# Patient Record
Sex: FEMALE | Race: WHITE | NOT HISPANIC OR LATINO
[De-identification: names, ages, dates, MRNs, and addresses within clinical notes are randomized per-mention and may not be internally consistent; named-entity substitution may affect disease eponyms.]

---

## 2017-02-15 ENCOUNTER — APPOINTMENT (OUTPATIENT)
Dept: OPHTHALMOLOGY | Facility: CLINIC | Age: 67
End: 2017-02-15

## 2018-07-09 NOTE — PATIENT DISCUSSION
PATIENT TO CONSIDER RLE FOR MONOVISION. DR Doreen Fernandes MAY DEEM CATARACTS VISUALLY SIGNIFICANT AT WORK UP.   PATIENT TO DISCONTINUE CLS AND USE ATS QID AT LEAST 1 WEEK PRIOR TO WORK-UP

## 2018-10-30 ENCOUNTER — IMPORTED ENCOUNTER (OUTPATIENT)
Dept: URBAN - METROPOLITAN AREA CLINIC 50 | Facility: CLINIC | Age: 68
End: 2018-10-30

## 2020-01-29 ENCOUNTER — IMPORTED ENCOUNTER (OUTPATIENT)
Dept: URBAN - METROPOLITAN AREA CLINIC 50 | Facility: CLINIC | Age: 70
End: 2020-01-29

## 2020-04-28 ENCOUNTER — IMPORTED ENCOUNTER (OUTPATIENT)
Dept: URBAN - METROPOLITAN AREA CLINIC 50 | Facility: CLINIC | Age: 70
End: 2020-04-28

## 2020-08-14 ENCOUNTER — IMPORTED ENCOUNTER (OUTPATIENT)
Dept: URBAN - METROPOLITAN AREA CLINIC 50 | Facility: CLINIC | Age: 70
End: 2020-08-14

## 2020-08-17 ENCOUNTER — IMPORTED ENCOUNTER (OUTPATIENT)
Dept: URBAN - METROPOLITAN AREA CLINIC 50 | Facility: CLINIC | Age: 70
End: 2020-08-17

## 2020-08-17 NOTE — PATIENT DISCUSSION
"""Insertion of hydro gel punctal plugs All lids done today with patient written consent."" ""Start Theratears both eyes three times a day . """

## 2020-08-24 ENCOUNTER — IMPORTED ENCOUNTER (OUTPATIENT)
Dept: URBAN - METROPOLITAN AREA CLINIC 50 | Facility: CLINIC | Age: 70
End: 2020-08-24

## 2020-10-15 ENCOUNTER — IMPORTED ENCOUNTER (OUTPATIENT)
Dept: URBAN - METROPOLITAN AREA CLINIC 50 | Facility: CLINIC | Age: 70
End: 2020-10-15

## 2021-04-17 ASSESSMENT — VISUAL ACUITY
OD_CC: J1+@ 16 IN
OD_BAT: 20/80
OS_PH: 20/40
OS_CC: J1
OS_CC: J1+@ 16 IN
OS_OTHER: 20/80. >20/400.
OD_CC: 20/30-
OD_CC: 20/20
OD_OTHER: 20/30. 20/40.
OD_PH: 20/40
OD_SC: 20/50
OS_CC: 20/30-
OD_CC: J1+
OS_SC: 20/70
OD_CC: 20/20
OS_BAT: 20/25
OD_OTHER: 20/80. >20/400.
OD_CC: 20/20
OS_OTHER: 20/25. 20/25.
OS_CC: 20/20-
OD_BAT: 20/30
OS_CC: 20/25-
OS_CC: J16
OD_CC: J1
OD_CC: J16
OS_CC: 20/20-1
OS_CC: J1+
OS_BAT: 20/80

## 2021-04-17 ASSESSMENT — TONOMETRY
OS_IOP_MMHG: 14
OS_IOP_MMHG: 13
OD_IOP_MMHG: 12
OS_IOP_MMHG: 13
OS_IOP_MMHG: 13
OD_IOP_MMHG: 14
OD_IOP_MMHG: 13
OD_IOP_MMHG: 13

## 2021-08-11 ENCOUNTER — PREPPED CHART (OUTPATIENT)
Dept: URBAN - METROPOLITAN AREA CLINIC 49 | Facility: CLINIC | Age: 71
End: 2021-08-11

## 2021-08-12 ENCOUNTER — PROBLEM (OUTPATIENT)
Dept: URBAN - METROPOLITAN AREA CLINIC 49 | Facility: CLINIC | Age: 71
End: 2021-08-12

## 2021-08-12 DIAGNOSIS — H35.373: ICD-10-CM

## 2021-08-12 DIAGNOSIS — H04.123: ICD-10-CM

## 2021-08-12 PROCEDURE — 92134 CPTRZ OPH DX IMG PST SGM RTA: CPT

## 2021-08-12 PROCEDURE — 92014 COMPRE OPH EXAM EST PT 1/>: CPT

## 2021-08-12 PROCEDURE — 92015 DETERMINE REFRACTIVE STATE: CPT

## 2021-08-12 PROCEDURE — 68761 CLOSE TEAR DUCT OPENING: CPT

## 2021-08-12 ASSESSMENT — TONOMETRY
OS_IOP_MMHG: 14
OD_IOP_MMHG: 14

## 2021-08-12 ASSESSMENT — VISUAL ACUITY
OS_GLARE: 20/30
OD_CC: 20/25-2
OS_GLARE: 20/40
OU_CC: J1
OS_CC: 20/25-1
OD_GLARE: 20/50
OD_GLARE: 20/40

## 2021-08-12 NOTE — PROCEDURE NOTE: CLINICAL
PROCEDURE NOTE: Punctal Plugs Bilateral Lower Lids. Diagnosis: Dry Eye Syndrome. Informed consent was obtained. Size/location of plugs inserted: Form fit. Patient tolerated procedure without complication or difficulty. Macy Rush

## 2022-02-03 ENCOUNTER — FOLLOW UP (OUTPATIENT)
Dept: URBAN - METROPOLITAN AREA CLINIC 49 | Facility: CLINIC | Age: 72
End: 2022-02-03

## 2022-02-03 DIAGNOSIS — H02.834: ICD-10-CM

## 2022-02-03 DIAGNOSIS — H43.813: ICD-10-CM

## 2022-02-03 DIAGNOSIS — H25.11: ICD-10-CM

## 2022-02-03 DIAGNOSIS — H02.831: ICD-10-CM

## 2022-02-03 DIAGNOSIS — H35.373: ICD-10-CM

## 2022-02-03 DIAGNOSIS — H04.123: ICD-10-CM

## 2022-02-03 DIAGNOSIS — H25.042: ICD-10-CM

## 2022-02-03 DIAGNOSIS — H10.13: ICD-10-CM

## 2022-02-03 PROCEDURE — 92134 CPTRZ OPH DX IMG PST SGM RTA: CPT

## 2022-02-03 PROCEDURE — 92014 COMPRE OPH EXAM EST PT 1/>: CPT

## 2022-02-03 ASSESSMENT — VISUAL ACUITY
OD_GLARE: 20/50
OU_CC: J1@17"
OD_CC: 20/30-2
OD_GLARE: 20/40
OS_GLARE: 20/40
OS_GLARE: 20/30
OS_CC: 20/25

## 2022-02-03 ASSESSMENT — TONOMETRY
OS_IOP_MMHG: 16
OD_IOP_MMHG: 16

## 2022-02-15 ENCOUNTER — DIAGNOSTICS ONLY (OUTPATIENT)
Dept: URBAN - METROPOLITAN AREA CLINIC 53 | Facility: CLINIC | Age: 72
End: 2022-02-15

## 2022-02-15 DIAGNOSIS — H25.042: ICD-10-CM

## 2022-02-15 DIAGNOSIS — H25.11: ICD-10-CM

## 2022-02-15 PROCEDURE — 92025IOL CORNEAL TOPOGRAPHY PREMIUM IOL

## 2022-02-15 PROCEDURE — 92136 OPHTHALMIC BIOMETRY: CPT

## 2022-02-15 ASSESSMENT — KERATOMETRY
OS_AXISANGLE_DEGREES: 072
OS_K2POWER_DIOPTERS: 44.50
OD_AXISANGLE_DEGREES: 90
OD_K2POWER_DIOPTERS: 43.50
OS_AXISANGLE2_DEGREES: 162
OS_K1POWER_DIOPTERS: 44.87
OD_AXISANGLE2_DEGREES: 0
OD_K1POWER_DIOPTERS: 43.62

## 2022-03-03 ENCOUNTER — PRE-OP/H&P (OUTPATIENT)
Dept: URBAN - METROPOLITAN AREA CLINIC 53 | Facility: CLINIC | Age: 72
End: 2022-03-03

## 2022-03-03 DIAGNOSIS — H25.11: ICD-10-CM

## 2022-03-03 DIAGNOSIS — H25.12: ICD-10-CM

## 2022-03-03 DIAGNOSIS — H35.373: ICD-10-CM

## 2022-03-03 DIAGNOSIS — H43.813: ICD-10-CM

## 2022-03-03 DIAGNOSIS — H25.042: ICD-10-CM

## 2022-03-03 PROCEDURE — 92014 COMPRE OPH EXAM EST PT 1/>: CPT

## 2022-03-03 ASSESSMENT — VISUAL ACUITY
OD_CC: 20/30
OS_CC: 20/25

## 2022-03-03 ASSESSMENT — TONOMETRY
OD_IOP_MMHG: 14
OS_IOP_MMHG: 14

## 2022-03-03 ASSESSMENT — KERATOMETRY
OD_AXISANGLE_DEGREES: 90
OS_AXISANGLE_DEGREES: 072
OD_K2POWER_DIOPTERS: 43.50
OS_K2POWER_DIOPTERS: 44.50
OS_AXISANGLE2_DEGREES: 162
OD_AXISANGLE2_DEGREES: 0
OS_K1POWER_DIOPTERS: 44.87
OD_K1POWER_DIOPTERS: 43.62

## 2022-03-03 NOTE — PATIENT DISCUSSION
CATARACT SURGERY PLANNER - STANDARD IOL/+FEMTO: Phacoemulsification with IOL: Eye: OD|DOS: 03/08/2022|Model: DIBOO|Power: +23.00|Target: PLANO|Femto: YE|Arcs: 40 @ 0 ; 40 @ 180|Visc: DUET|Omidria: YES|10% Phenylephrine: YES|Epi-shugarcaine: YES|Phaco Setting: DENSE|BSS+: NO|Trypan Blue: NO|CTR: NO|Olive Tip: NO|Atropine: NO|Pupilloplasty: NO|Notes: DENSE. Martin Rust

## 2022-03-08 ENCOUNTER — SURGERY/PROCEDURE (OUTPATIENT)
Dept: URBAN - METROPOLITAN AREA SURGERY 16 | Facility: SURGERY | Age: 72
End: 2022-03-08

## 2022-03-08 ENCOUNTER — POST-OP (OUTPATIENT)
Dept: URBAN - METROPOLITAN AREA CLINIC 53 | Facility: CLINIC | Age: 72
End: 2022-03-08

## 2022-03-08 DIAGNOSIS — H25.11: ICD-10-CM

## 2022-03-08 DIAGNOSIS — Z98.41: ICD-10-CM

## 2022-03-08 DIAGNOSIS — Z96.1: ICD-10-CM

## 2022-03-08 PROCEDURE — 99024 POSTOP FOLLOW-UP VISIT: CPT

## 2022-03-08 PROCEDURE — 66984 XCAPSL CTRC RMVL W/O ECP: CPT

## 2022-03-08 ASSESSMENT — TONOMETRY: OD_IOP_MMHG: 13

## 2022-03-08 ASSESSMENT — KERATOMETRY
OS_AXISANGLE_DEGREES: 072
OS_AXISANGLE2_DEGREES: 162
OS_K1POWER_DIOPTERS: 44.87
OD_K2POWER_DIOPTERS: 43.50
OS_K2POWER_DIOPTERS: 44.50
OD_AXISANGLE2_DEGREES: 0
OD_AXISANGLE_DEGREES: 90
OD_K1POWER_DIOPTERS: 43.62

## 2022-03-08 ASSESSMENT — VISUAL ACUITY: OD_SC: 20/30

## 2022-03-08 NOTE — PATIENT DISCUSSION
FOLLOW DROP SCHEDULE AND INCREASE PURPLE TOP ( PRED- MOXI- NEPAF) DROPS TO 3 TIMES A DAY. sTOP LID SCRUBS AT THIS TIME.

## 2022-03-18 ENCOUNTER — POST OP/EVAL OF SECOND EYE (OUTPATIENT)
Dept: URBAN - METROPOLITAN AREA CLINIC 53 | Facility: CLINIC | Age: 72
End: 2022-03-18

## 2022-03-18 DIAGNOSIS — Z98.41: ICD-10-CM

## 2022-03-18 DIAGNOSIS — H25.12: ICD-10-CM

## 2022-03-18 DIAGNOSIS — Z96.1: ICD-10-CM

## 2022-03-18 PROCEDURE — 92136 - 2N OPHTHALMIC BIOMETRY BY PARTIAL COHERENCE INTERFEROMETRY WITH INTRAOCULAR LENS POWER CALCULATION

## 2022-03-18 PROCEDURE — PREOP PRE OP VISIT

## 2022-03-18 ASSESSMENT — TONOMETRY
OS_IOP_MMHG: 14
OD_IOP_MMHG: 14

## 2022-03-18 ASSESSMENT — VISUAL ACUITY
OS_CC: 20/25
OD_SC: J1+
OD_SC: J3
OD_SC: 20/20

## 2022-03-18 NOTE — PATIENT DISCUSSION
CATARACT SURGERY PLANNER - STANDARD IOL/+FEMTO: Phacoemulsification with IOL: Eye: OS|DOS: 03/29/2022|Model: DIBOO|Power: +22.50|Target: PLANO|Femto: YES|Arcs: 30 @ 70 ; 30 @ 250|Visc: DUET|Omidria: YES|10% Phenylephrine: YES|Epi-shugarcaine: YES|Phaco Setting: DENSE|BSS+: NO|Trypan Blue: NO|CTR: NO|Olive Tip: NO|Atropine: NO|Pupilloplasty: NO|Notes: N/A.

## 2022-03-18 NOTE — PATIENT DISCUSSION
s/p PO # 1 Cataract extraction with od IOL, doing well. Continue with post op drops as directed, see instruction sheet provided to the patient. The patient would like to schedule cataract extraction with os IOL.

## 2022-03-29 ENCOUNTER — SURGERY/PROCEDURE (OUTPATIENT)
Dept: URBAN - METROPOLITAN AREA SURGERY 16 | Facility: SURGERY | Age: 72
End: 2022-03-29

## 2022-03-29 ENCOUNTER — POST-OP (OUTPATIENT)
Dept: URBAN - METROPOLITAN AREA CLINIC 48 | Facility: CLINIC | Age: 72
End: 2022-03-29

## 2022-03-29 DIAGNOSIS — H43.813: ICD-10-CM

## 2022-03-29 DIAGNOSIS — H02.831: ICD-10-CM

## 2022-03-29 DIAGNOSIS — Z98.42: ICD-10-CM

## 2022-03-29 DIAGNOSIS — H04.123: ICD-10-CM

## 2022-03-29 DIAGNOSIS — H25.12: ICD-10-CM

## 2022-03-29 DIAGNOSIS — Z98.41: ICD-10-CM

## 2022-03-29 DIAGNOSIS — H25.11: ICD-10-CM

## 2022-03-29 DIAGNOSIS — Z96.1: ICD-10-CM

## 2022-03-29 DIAGNOSIS — H02.834: ICD-10-CM

## 2022-03-29 DIAGNOSIS — H10.13: ICD-10-CM

## 2022-03-29 PROCEDURE — 66984 XCAPSL CTRC RMVL W/O ECP: CPT

## 2022-03-29 PROCEDURE — 99024 POSTOP FOLLOW-UP VISIT: CPT

## 2022-03-29 ASSESSMENT — TONOMETRY: OS_IOP_MMHG: 16

## 2022-03-29 ASSESSMENT — VISUAL ACUITY: OS_SC: 20/200

## 2022-04-06 ENCOUNTER — POST-OP (OUTPATIENT)
Dept: URBAN - METROPOLITAN AREA CLINIC 48 | Facility: CLINIC | Age: 72
End: 2022-04-06

## 2022-04-06 DIAGNOSIS — Z98.42: ICD-10-CM

## 2022-04-06 DIAGNOSIS — Z96.1: ICD-10-CM

## 2022-04-06 ASSESSMENT — VISUAL ACUITY
OD_SC: 20/25-2
OS_SC: 20/30+1
OU_CC: 20/25-1
OU_SC: J2
OU_SC: 20/25-2

## 2022-04-06 ASSESSMENT — TONOMETRY
OD_IOP_MMHG: 14
OS_IOP_MMHG: 14

## 2022-05-05 ENCOUNTER — POST-OP (OUTPATIENT)
Dept: URBAN - METROPOLITAN AREA CLINIC 53 | Facility: CLINIC | Age: 72
End: 2022-05-05

## 2022-05-05 DIAGNOSIS — Z98.42: ICD-10-CM

## 2022-05-05 DIAGNOSIS — H26.493: ICD-10-CM

## 2022-05-05 DIAGNOSIS — H35.373: ICD-10-CM

## 2022-05-05 DIAGNOSIS — Z98.41: ICD-10-CM

## 2022-05-05 DIAGNOSIS — H43.813: ICD-10-CM

## 2022-05-05 PROCEDURE — 99024 POSTOP FOLLOW-UP VISIT: CPT

## 2022-05-05 PROCEDURE — 92015 DETERMINE REFRACTIVE STATE: CPT

## 2022-05-05 PROCEDURE — 92134 CPTRZ OPH DX IMG PST SGM RTA: CPT

## 2022-05-05 ASSESSMENT — VISUAL ACUITY
OD_GLARE: 20/25
OS_SC: J16
OD_SC: J10
OD_SC: 20/60
OS_SC: 20/50
OD_SC: 20/25
OS_SC: 20/25-
OS_GLARE: 20/25
OS_GLARE: 20/25
OD_GLARE: 20/25

## 2022-05-05 ASSESSMENT — TONOMETRY
OS_IOP_MMHG: 13
OD_IOP_MMHG: 14

## 2022-09-22 ENCOUNTER — COMPREHENSIVE EXAM (OUTPATIENT)
Dept: URBAN - METROPOLITAN AREA CLINIC 53 | Facility: CLINIC | Age: 72
End: 2022-09-22

## 2022-09-22 DIAGNOSIS — H35.373: ICD-10-CM

## 2022-09-22 DIAGNOSIS — H43.813: ICD-10-CM

## 2022-09-22 DIAGNOSIS — H16.223: ICD-10-CM

## 2022-09-22 DIAGNOSIS — H02.834: ICD-10-CM

## 2022-09-22 DIAGNOSIS — H26.493: ICD-10-CM

## 2022-09-22 DIAGNOSIS — H02.831: ICD-10-CM

## 2022-09-22 PROCEDURE — 92015 DETERMINE REFRACTIVE STATE: CPT

## 2022-09-22 PROCEDURE — 92014 COMPRE OPH EXAM EST PT 1/>: CPT

## 2022-09-22 RX ORDER — FLUOROMETHOLONE 1 MG/ML
1 SOLUTION/ DROPS OPHTHALMIC TWICE A DAY
Start: 2022-09-22

## 2022-09-22 RX ORDER — LIFITEGRAST 50 MG/ML
1 SOLUTION/ DROPS OPHTHALMIC TWICE A DAY
Start: 2022-09-22

## 2022-09-22 ASSESSMENT — VISUAL ACUITY
OD_SC: 20/20
OS_GLARE: 20/25
OU_SC: J2"14IN
OS_GLARE: 20/30
OU_CC: J1+"14IN
OD_GLARE: 20/30
OS_PH: 20/25
OD_GLARE: 20/30
OS_SC: 20/30-1

## 2022-09-22 ASSESSMENT — TONOMETRY
OS_IOP_MMHG: 15
OD_IOP_MMHG: 14

## 2022-09-22 NOTE — PATIENT DISCUSSION
DED (RX) Symptomatic dry eye has not improved with the use of conservative treatments such as artificial tears, warm compresses, and/or lid scrubs. Recommend patient start prescription drops, Xiidra/Restasis BID OU. Patient informed of initial stinging and discomfort, possible high cost, and need of long-term treatment for benefit. Advised patient to continue preservative-free artificial tears QID OU, warm compresses BID OU, and lid scrubs BID OU for best results. RTC for follow up appointment 6-8 weeks after starting Xiidra/Restasis.

## 2022-09-22 NOTE — PATIENT DISCUSSION
Julieta Hadley: Patient counseled as to delayed onset effect of Ann Callaway. Discussed onset of action may take 2-3 months to achieve maximum effect. Reviewed with patient mild degree of irritation and burning with onset of usage. Patient verbalizes understanding and wishes to start medication. Will e-prescribe medication to patient's pharmacy and follow up to determine effect.

## 2022-11-15 ENCOUNTER — FOLLOW UP (OUTPATIENT)
Dept: URBAN - METROPOLITAN AREA CLINIC 53 | Facility: CLINIC | Age: 72
End: 2022-11-15

## 2022-11-15 DIAGNOSIS — H04.123: ICD-10-CM

## 2022-11-15 DIAGNOSIS — H26.493: ICD-10-CM

## 2022-11-15 PROCEDURE — 92012 INTRM OPH EXAM EST PATIENT: CPT

## 2022-11-15 PROCEDURE — 6876150 PUNCTUM PLUG, BILATERAL

## 2022-11-15 RX ORDER — POLYETHYLENE GLYCOL 400 2.5 MG/ML: 1 SOLUTION/ DROPS OPHTHALMIC

## 2022-11-15 ASSESSMENT — VISUAL ACUITY
OS_GLARE: 20/40
OS_SC: 20/25
OD_GLARE: 20/40
OS_GLARE: 20/60
OD_SC: 20/20
OD_GLARE: 20/25

## 2022-11-15 ASSESSMENT — TONOMETRY
OD_IOP_MMHG: 11
OS_IOP_MMHG: 12

## 2022-11-15 NOTE — PATIENT DISCUSSION
Discussed with patient to switch from flaxseed to fish oil. Start Blink artificial tears OU TID. Gel plugs placed with patient written consent.

## 2022-11-15 NOTE — PROCEDURE NOTE: CLINICAL
PROCEDURE NOTE: Punctal Plugs, Bilateral OU. Diagnosis: Dry Eye Syndrome. Informed consent was obtained. Size/location of plugs inserted: Gel Plugs 0.3mm x 2.5mm. Patient tolerated procedure without complication or difficulty. Thang Carpenter

## 2022-12-13 ENCOUNTER — APPOINTMENT (RX ONLY)
Dept: URBAN - METROPOLITAN AREA CLINIC 58 | Facility: CLINIC | Age: 72
Setting detail: DERMATOLOGY
End: 2022-12-13

## 2022-12-13 DIAGNOSIS — B35.4 TINEA CORPORIS: ICD-10-CM | Status: INADEQUATELY CONTROLLED

## 2022-12-13 PROCEDURE — ? COUNSELING

## 2022-12-13 PROCEDURE — 99203 OFFICE O/P NEW LOW 30 MIN: CPT

## 2022-12-13 PROCEDURE — ? PRESCRIPTION MEDICATION MANAGEMENT

## 2022-12-13 PROCEDURE — ? PRESCRIPTION

## 2022-12-13 RX ORDER — KETOCONAZOLE 20 MG/G
CREAM TOPICAL BID
Qty: 60 | Refills: 1 | Status: ERX | COMMUNITY
Start: 2022-12-13

## 2022-12-13 RX ADMIN — KETOCONAZOLE: 20 CREAM TOPICAL at 00:00

## 2022-12-13 ASSESSMENT — LOCATION ZONE DERM
LOCATION ZONE: FACE
LOCATION ZONE: ARM
LOCATION ZONE: SCALP

## 2022-12-13 ASSESSMENT — LOCATION DETAILED DESCRIPTION DERM
LOCATION DETAILED: LEFT PROXIMAL POSTERIOR UPPER ARM
LOCATION DETAILED: RIGHT SUPERIOR OCCIPITAL SCALP
LOCATION DETAILED: LEFT CHIN
LOCATION DETAILED: LEFT DISTAL DORSAL FOREARM

## 2022-12-13 ASSESSMENT — LOCATION SIMPLE DESCRIPTION DERM
LOCATION SIMPLE: LEFT POSTERIOR UPPER ARM
LOCATION SIMPLE: LEFT FOREARM
LOCATION SIMPLE: POSTERIOR SCALP
LOCATION SIMPLE: CHIN

## 2022-12-13 NOTE — HPI: SKIN LESION
What Type Of Note Output Would You Prefer (Optional)?: Standard Output
How Severe Is Your Skin Lesion?: moderate
Has Your Skin Lesion Been Treated?: not been treated
Is This A New Presentation, Or A Follow-Up?: Skin Lesions
Additional History: Patient stated she believes she has ring worm from kittens she got two weeks ago.

## 2023-04-04 ENCOUNTER — ESTABLISHED PATIENT (OUTPATIENT)
Dept: URBAN - METROPOLITAN AREA CLINIC 53 | Facility: CLINIC | Age: 73
End: 2023-04-04

## 2023-04-04 DIAGNOSIS — H02.883: ICD-10-CM

## 2023-04-04 DIAGNOSIS — H04.123: ICD-10-CM

## 2023-04-04 DIAGNOSIS — H02.886: ICD-10-CM

## 2023-04-04 PROCEDURE — 92012 INTRM OPH EXAM EST PATIENT: CPT

## 2023-04-04 ASSESSMENT — VISUAL ACUITY
OD_SC: 20/30-1
OS_PH: 20/40-1
OS_SC: 20/60

## 2023-04-04 ASSESSMENT — TONOMETRY
OD_IOP_MMHG: 16
OS_IOP_MMHG: 17

## 2023-08-17 ENCOUNTER — ESTABLISHED PATIENT (OUTPATIENT)
Dept: URBAN - METROPOLITAN AREA CLINIC 49 | Facility: LOCATION | Age: 73
End: 2023-08-17

## 2023-08-17 DIAGNOSIS — H10.13: ICD-10-CM

## 2023-08-17 DIAGNOSIS — T15.02XA: ICD-10-CM

## 2023-08-17 PROCEDURE — 99213 OFFICE O/P EST LOW 20 MIN: CPT

## 2023-08-17 PROCEDURE — 65222 REMOVE FOREIGN BODY FROM EYE: CPT

## 2023-08-17 RX ORDER — TOBRAMYCIN AND DEXAMETHASONE 1; 3 MG/ML; MG/ML: 1 SUSPENSION/ DROPS OPHTHALMIC

## 2023-08-17 ASSESSMENT — TONOMETRY
OD_IOP_MMHG: 16
OS_IOP_MMHG: 16

## 2023-08-17 ASSESSMENT — VISUAL ACUITY
OD_SC: 20/25-2
OS_SC: 20/40

## 2023-10-03 ENCOUNTER — COMPREHENSIVE EXAM (OUTPATIENT)
Dept: URBAN - METROPOLITAN AREA CLINIC 53 | Facility: CLINIC | Age: 73
End: 2023-10-03

## 2023-10-03 DIAGNOSIS — H35.373: ICD-10-CM

## 2023-10-03 DIAGNOSIS — H04.123: ICD-10-CM

## 2023-10-03 DIAGNOSIS — H26.493: ICD-10-CM

## 2023-10-03 DIAGNOSIS — H43.813: ICD-10-CM

## 2023-10-03 PROCEDURE — 99214 OFFICE O/P EST MOD 30 MIN: CPT

## 2023-10-03 PROCEDURE — 92134 CPTRZ OPH DX IMG PST SGM RTA: CPT

## 2023-10-03 ASSESSMENT — VISUAL ACUITY
OD_SC: 20/25
OD_GLARE: 20/20
OS_SC: 20/30-2
OS_GLARE: 20/25
OD_GLARE: 20/20
OS_GLARE: 20/25
OU_CC: J1+@14"

## 2023-10-03 ASSESSMENT — TONOMETRY
OD_IOP_MMHG: 12
OS_IOP_MMHG: 12

## 2024-01-23 ENCOUNTER — APPOINTMENT (RX ONLY)
Dept: URBAN - METROPOLITAN AREA CLINIC 58 | Facility: CLINIC | Age: 74
Setting detail: DERMATOLOGY
End: 2024-01-23

## 2024-01-23 DIAGNOSIS — L82.1 OTHER SEBORRHEIC KERATOSIS: ICD-10-CM | Status: STABLE

## 2024-01-23 DIAGNOSIS — L81.4 OTHER MELANIN HYPERPIGMENTATION: ICD-10-CM | Status: STABLE

## 2024-01-23 DIAGNOSIS — D18.0 HEMANGIOMA: ICD-10-CM | Status: STABLE

## 2024-01-23 DIAGNOSIS — L82.0 INFLAMED SEBORRHEIC KERATOSIS: ICD-10-CM | Status: WORSENING

## 2024-01-23 DIAGNOSIS — D22 MELANOCYTIC NEVI: ICD-10-CM | Status: STABLE

## 2024-01-23 PROBLEM — D22.5 MELANOCYTIC NEVI OF TRUNK: Status: ACTIVE | Noted: 2024-01-23

## 2024-01-23 PROBLEM — D18.01 HEMANGIOMA OF SKIN AND SUBCUTANEOUS TISSUE: Status: ACTIVE | Noted: 2024-01-23

## 2024-01-23 PROCEDURE — ? FULL BODY SKIN EXAM

## 2024-01-23 PROCEDURE — ? LIQUID NITROGEN

## 2024-01-23 PROCEDURE — ? COUNSELING

## 2024-01-23 PROCEDURE — ? TREATMENT REGIMEN

## 2024-01-23 PROCEDURE — 17110 DESTRUCTION B9 LES UP TO 14: CPT

## 2024-01-23 PROCEDURE — 99213 OFFICE O/P EST LOW 20 MIN: CPT | Mod: 25

## 2024-01-23 ASSESSMENT — LOCATION SIMPLE DESCRIPTION DERM
LOCATION SIMPLE: RIGHT FOOT
LOCATION SIMPLE: UPPER LIP
LOCATION SIMPLE: LEFT FOOT
LOCATION SIMPLE: LEFT LIP
LOCATION SIMPLE: ABDOMEN

## 2024-01-23 ASSESSMENT — LOCATION DETAILED DESCRIPTION DERM
LOCATION DETAILED: LEFT LATERAL ACHILLES SKIN
LOCATION DETAILED: LEFT UPPER CUTANEOUS LIP
LOCATION DETAILED: PERIUMBILICAL SKIN
LOCATION DETAILED: EPIGASTRIC SKIN
LOCATION DETAILED: RIGHT LATERAL ACHILLES SKIN
LOCATION DETAILED: PHILTRUM

## 2024-01-23 ASSESSMENT — LOCATION ZONE DERM
LOCATION ZONE: LIP
LOCATION ZONE: TRUNK
LOCATION ZONE: FEET

## 2024-01-23 NOTE — PROCEDURE: LIQUID NITROGEN
Medical Necessity Information: It is in your best interest to select a reason for this procedure from the list below. All of these items fulfill various CMS LCD requirements except the new and changing color options.
Medical Necessity Clause: This procedure was medically necessary because the lesions that were treated were:
Show Topical Anesthesia Variable?: Yes
Spray Paint Technique: No
Detail Level: Detailed
Spray Paint Text: The liquid nitrogen was applied to the skin utilizing a spray paint frosting technique.
Post-Care Instructions: I reviewed with the patient in detail post-care instructions. Patient is to wear sunprotection, and avoid picking at any of the treated lesions. Pt may apply Vaseline to crusted or scabbing areas.
Consent: The patient's consent was obtained including but not limited to risks of crusting, scabbing, blistering, scarring, darker or lighter pigmentary change, recurrence, incomplete removal and infection.

## 2024-02-12 ENCOUNTER — ESTABLISHED PATIENT (OUTPATIENT)
Dept: URBAN - METROPOLITAN AREA CLINIC 53 | Facility: CLINIC | Age: 74
End: 2024-02-12

## 2024-02-12 DIAGNOSIS — H02.883: ICD-10-CM

## 2024-02-12 DIAGNOSIS — H02.886: ICD-10-CM

## 2024-02-12 PROCEDURE — 92012 INTRM OPH EXAM EST PATIENT: CPT

## 2024-02-12 RX ORDER — OLOPATADINE HYDROCHLORIDE 2 MG/ML: 1 SOLUTION OPHTHALMIC ONCE A DAY

## 2024-02-12 ASSESSMENT — VISUAL ACUITY
OS_SC: 20/30
OD_SC: 20/25

## 2024-02-12 ASSESSMENT — TONOMETRY
OD_IOP_MMHG: 12
OS_IOP_MMHG: 12

## 2024-10-15 ENCOUNTER — APPOINTMENT (RX ONLY)
Dept: URBAN - METROPOLITAN AREA CLINIC 58 | Facility: CLINIC | Age: 74
Setting detail: DERMATOLOGY
End: 2024-10-15

## 2024-10-15 DIAGNOSIS — L57.0 ACTINIC KERATOSIS: ICD-10-CM | Status: INADEQUATELY CONTROLLED

## 2024-10-15 DIAGNOSIS — L81.4 OTHER MELANIN HYPERPIGMENTATION: ICD-10-CM | Status: STABLE

## 2024-10-15 DIAGNOSIS — L82.0 INFLAMED SEBORRHEIC KERATOSIS: ICD-10-CM | Status: WORSENING

## 2024-10-15 PROCEDURE — 99212 OFFICE O/P EST SF 10 MIN: CPT | Mod: 25

## 2024-10-15 PROCEDURE — ? COUNSELING

## 2024-10-15 PROCEDURE — 17003 DESTRUCT PREMALG LES 2-14: CPT | Mod: 59

## 2024-10-15 PROCEDURE — ? LIQUID NITROGEN

## 2024-10-15 PROCEDURE — 17000 DESTRUCT PREMALG LESION: CPT | Mod: 59

## 2024-10-15 PROCEDURE — 17110 DESTRUCTION B9 LES UP TO 14: CPT

## 2024-10-15 ASSESSMENT — LOCATION ZONE DERM
LOCATION ZONE: FACE
LOCATION ZONE: NOSE
LOCATION ZONE: HAND
LOCATION ZONE: ARM
LOCATION ZONE: TRUNK

## 2024-10-15 ASSESSMENT — LOCATION DETAILED DESCRIPTION DERM
LOCATION DETAILED: NASAL DORSUM
LOCATION DETAILED: LEFT MEDIAL MALAR CHEEK
LOCATION DETAILED: RIGHT RADIAL DORSAL HAND
LOCATION DETAILED: RIGHT DISTAL DORSAL FOREARM
LOCATION DETAILED: NASAL ROOT
LOCATION DETAILED: LEFT LATERAL SUPERIOR CHEST

## 2024-10-15 ASSESSMENT — LOCATION SIMPLE DESCRIPTION DERM
LOCATION SIMPLE: LEFT CHEEK
LOCATION SIMPLE: RIGHT HAND
LOCATION SIMPLE: NOSE
LOCATION SIMPLE: CHEST
LOCATION SIMPLE: RIGHT FOREARM

## 2024-10-15 NOTE — PROCEDURE: LIQUID NITROGEN
Render Note In Bullet Format When Appropriate: No
Medical Necessity Clause: This procedure was medically necessary because the lesions that were treated were:
Spray Paint Text: The liquid nitrogen was applied to the skin utilizing a spray paint frosting technique.
Consent: The patient's consent was obtained including but not limited to risks of crusting, scabbing, blistering, scarring, darker or lighter pigmentary change, recurrence, incomplete removal and infection.
Medical Necessity Information: It is in your best interest to select a reason for this procedure from the list below. All of these items fulfill various CMS LCD requirements except the new and changing color options.
Detail Level: Detailed
Post-Care Instructions: I reviewed with the patient in detail post-care instructions. Patient is to wear sunprotection, and avoid picking at any of the treated lesions. Pt may apply Vaseline to crusted or scabbing areas.
Show Topical Anesthesia Variable?: Yes
Duration Of Freeze Thaw-Cycle (Seconds): 0

## 2025-01-21 ENCOUNTER — APPOINTMENT (OUTPATIENT)
Dept: URBAN - METROPOLITAN AREA CLINIC 58 | Facility: CLINIC | Age: 75
Setting detail: DERMATOLOGY
End: 2025-01-21

## 2025-01-21 DIAGNOSIS — D22 MELANOCYTIC NEVI: ICD-10-CM | Status: STABLE

## 2025-01-21 DIAGNOSIS — L57.0 ACTINIC KERATOSIS: ICD-10-CM

## 2025-01-21 DIAGNOSIS — D18.0 HEMANGIOMA: ICD-10-CM | Status: STABLE

## 2025-01-21 DIAGNOSIS — L82.0 INFLAMED SEBORRHEIC KERATOSIS: ICD-10-CM | Status: WORSENING

## 2025-01-21 DIAGNOSIS — L82.1 OTHER SEBORRHEIC KERATOSIS: ICD-10-CM | Status: STABLE

## 2025-01-21 DIAGNOSIS — L81.4 OTHER MELANIN HYPERPIGMENTATION: ICD-10-CM | Status: STABLE

## 2025-01-21 PROBLEM — D18.01 HEMANGIOMA OF SKIN AND SUBCUTANEOUS TISSUE: Status: ACTIVE | Noted: 2025-01-21

## 2025-01-21 PROBLEM — D48.5 NEOPLASM OF UNCERTAIN BEHAVIOR OF SKIN: Status: ACTIVE | Noted: 2025-01-21

## 2025-01-21 PROBLEM — D22.5 MELANOCYTIC NEVI OF TRUNK: Status: ACTIVE | Noted: 2025-01-21

## 2025-01-21 PROCEDURE — ? COUNSELING

## 2025-01-21 PROCEDURE — 17110 DESTRUCTION B9 LES UP TO 14: CPT

## 2025-01-21 PROCEDURE — 99213 OFFICE O/P EST LOW 20 MIN: CPT | Mod: 25

## 2025-01-21 PROCEDURE — ? TREATMENT REGIMEN

## 2025-01-21 PROCEDURE — ? BIOPSY BY SHAVE METHOD

## 2025-01-21 PROCEDURE — ? LIQUID NITROGEN

## 2025-01-21 PROCEDURE — 17003 DESTRUCT PREMALG LES 2-14: CPT | Mod: 59

## 2025-01-21 PROCEDURE — 17000 DESTRUCT PREMALG LESION: CPT | Mod: 59

## 2025-01-21 PROCEDURE — 11102 TANGNTL BX SKIN SINGLE LES: CPT | Mod: 59

## 2025-01-21 ASSESSMENT — LOCATION DETAILED DESCRIPTION DERM
LOCATION DETAILED: PERIUMBILICAL SKIN
LOCATION DETAILED: RIGHT MEDIAL MALAR CHEEK
LOCATION DETAILED: RIGHT INFERIOR MEDIAL UPPER BACK
LOCATION DETAILED: LEFT CENTRAL EYEBROW
LOCATION DETAILED: NASAL SUPRATIP
LOCATION DETAILED: NASAL ROOT
LOCATION DETAILED: EPIGASTRIC SKIN
LOCATION DETAILED: RIGHT SUPERIOR MEDIAL MALAR CHEEK
LOCATION DETAILED: LEFT INFERIOR LATERAL FOREHEAD
LOCATION DETAILED: LEFT MEDIAL MALAR CHEEK
LOCATION DETAILED: LEFT MID-UPPER BACK
LOCATION DETAILED: INFERIOR THORACIC SPINE
LOCATION DETAILED: RIGHT INFERIOR UPPER BACK

## 2025-01-21 ASSESSMENT — LOCATION SIMPLE DESCRIPTION DERM
LOCATION SIMPLE: LEFT UPPER BACK
LOCATION SIMPLE: LEFT FOREHEAD
LOCATION SIMPLE: UPPER BACK
LOCATION SIMPLE: RIGHT UPPER BACK
LOCATION SIMPLE: RIGHT CHEEK
LOCATION SIMPLE: NOSE
LOCATION SIMPLE: LEFT CHEEK
LOCATION SIMPLE: ABDOMEN
LOCATION SIMPLE: LEFT EYEBROW

## 2025-01-21 ASSESSMENT — LOCATION ZONE DERM
LOCATION ZONE: FACE
LOCATION ZONE: NOSE
LOCATION ZONE: TRUNK

## 2025-01-21 NOTE — PROCEDURE: BIOPSY BY SHAVE METHOD
Detail Level: Detailed
Depth Of Biopsy: dermis
Was A Bandage Applied: Yes
Size Of Lesion In Cm: 0.5
Biopsy Type: H and E
Biopsy Method: Dermablade
Anesthesia Type: 1% lidocaine with epinephrine
Additional Anesthesia Volume In Cc (Will Not Render If 0): 0
Hemostasis: Drysol
Wound Care: Petrolatum
Dressing: bandage
Destruction After The Procedure: No
Type Of Destruction Used: Curettage
Curettage Text: The wound bed was treated with curettage after the biopsy was performed.
Cryotherapy Text: The wound bed was treated with cryotherapy after the biopsy was performed.
Electrodesiccation Text: The wound bed was treated with electrodesiccation after the biopsy was performed.
Electrodesiccation And Curettage Text: The wound bed was treated with electrodesiccation and curettage after the biopsy was performed.
Silver Nitrate Text: The wound bed was treated with silver nitrate after the biopsy was performed.
Lab: [id number]
Lab Facility: [id number]
Medical Necessity Information: It is in your best interest to select a reason for this procedure from the list below. All of these items fulfill various CMS LCD requirements except the new and changing color options.
Consent: Written consent was obtained and risks were reviewed including but not limited to scarring, infection, bleeding, scabbing, incomplete removal, nerve damage and allergy to anesthesia.
Post-Care Instructions: I reviewed with the patient in detail post-care instructions. Patient is to keep the biopsy site dry overnight, and then apply bacitracin twice daily until healed. Patient may apply hydrogen peroxide soaks to remove any crusting.
Notification Instructions: Patient will be notified of biopsy results. However, patient instructed to call the office if not contacted within 2 weeks.
Billing Type: Third-Party Bill
Information: Selecting Yes will display possible errors in your note based on the variables you have selected. This validation is only offered as a suggestion for you. PLEASE NOTE THAT THE VALIDATION TEXT WILL BE REMOVED WHEN YOU FINALIZE YOUR NOTE. IF YOU WANT TO FAX A PRELIMINARY NOTE YOU WILL NEED TO TOGGLE THIS TO 'NO' IF YOU DO NOT WANT IT IN YOUR FAXED NOTE.

## 2025-01-21 NOTE — PROCEDURE: LIQUID NITROGEN
Consent: The patient's consent was obtained including but not limited to risks of crusting, scabbing, blistering, scarring, darker or lighter pigmentary change, recurrence, incomplete removal and infection.
Duration Of Freeze Thaw-Cycle (Seconds): 0
Show Applicator Variable?: Yes
Detail Level: Detailed
Post-Care Instructions: I reviewed with the patient in detail post-care instructions. Patient is to wear sunprotection, and avoid picking at any of the treated lesions. Pt may apply Vaseline to crusted or scabbing areas.
Render Post-Care Instructions In Note?: no
Medical Necessity Information: It is in your best interest to select a reason for this procedure from the list below. All of these items fulfill various CMS LCD requirements except the new and changing color options.
Medical Necessity Clause: This procedure was medically necessary because the lesions that were treated were:
Spray Paint Text: The liquid nitrogen was applied to the skin utilizing a spray paint frosting technique.

## 2025-02-06 ENCOUNTER — APPOINTMENT (OUTPATIENT)
Dept: URBAN - METROPOLITAN AREA CLINIC 58 | Facility: CLINIC | Age: 75
Setting detail: DERMATOLOGY
End: 2025-02-06

## 2025-02-06 DIAGNOSIS — L57.0 ACTINIC KERATOSIS: ICD-10-CM | Status: RESOLVING

## 2025-02-06 PROCEDURE — 99213 OFFICE O/P EST LOW 20 MIN: CPT

## 2025-02-06 PROCEDURE — ? COUNSELING

## 2025-02-06 PROCEDURE — ? ADDITIONAL NOTES

## 2025-02-06 PROCEDURE — ? PATHOLOGY DISCUSSION

## 2025-02-06 ASSESSMENT — LOCATION ZONE DERM: LOCATION ZONE: NOSE

## 2025-02-06 ASSESSMENT — LOCATION DETAILED DESCRIPTION DERM: LOCATION DETAILED: NASAL ROOT

## 2025-02-06 ASSESSMENT — LOCATION SIMPLE DESCRIPTION DERM: LOCATION SIMPLE: NOSE

## 2025-02-06 NOTE — PROCEDURE: ADDITIONAL NOTES
Detail Level: Zone
Additional Notes: Upon examination, no further treatment was required.
Render Risk Assessment In Note?: no

## 2025-04-01 ENCOUNTER — COMPREHENSIVE EXAM (OUTPATIENT)
Age: 75
End: 2025-04-01

## 2025-04-01 DIAGNOSIS — H52.4: ICD-10-CM

## 2025-04-01 DIAGNOSIS — H02.834: ICD-10-CM

## 2025-04-01 DIAGNOSIS — H02.831: ICD-10-CM

## 2025-04-01 DIAGNOSIS — H04.123: ICD-10-CM

## 2025-04-01 DIAGNOSIS — H26.493: ICD-10-CM

## 2025-04-01 DIAGNOSIS — H43.813: ICD-10-CM

## 2025-04-01 PROCEDURE — 99214 OFFICE O/P EST MOD 30 MIN: CPT

## 2025-04-01 PROCEDURE — 92134 CPTRZ OPH DX IMG PST SGM RTA: CPT | Mod: NC

## 2025-06-23 ENCOUNTER — EMERGENCY VISIT (OUTPATIENT)
Age: 75
End: 2025-06-23

## 2025-06-23 DIAGNOSIS — T15.02XA: ICD-10-CM

## 2025-06-23 PROCEDURE — 99213 OFFICE O/P EST LOW 20 MIN: CPT

## 2025-06-23 RX ORDER — OFLOXACIN 3 MG/ML: 1 SOLUTION OPHTHALMIC
